# Patient Record
Sex: MALE | Race: WHITE | NOT HISPANIC OR LATINO | URBAN - METROPOLITAN AREA
[De-identification: names, ages, dates, MRNs, and addresses within clinical notes are randomized per-mention and may not be internally consistent; named-entity substitution may affect disease eponyms.]

---

## 2020-01-21 ENCOUNTER — OUTPATIENT (OUTPATIENT)
Dept: OUTPATIENT SERVICES | Facility: HOSPITAL | Age: 75
LOS: 1 days | End: 2020-01-21
Payer: COMMERCIAL

## 2020-01-21 DIAGNOSIS — Z22.321 CARRIER OR SUSPECTED CARRIER OF METHICILLIN SUSCEPTIBLE STAPHYLOCOCCUS AUREUS: ICD-10-CM

## 2020-01-21 LAB
MRSA PCR RESULT.: NEGATIVE — SIGNIFICANT CHANGE UP
S AUREUS DNA NOSE QL NAA+PROBE: NEGATIVE — SIGNIFICANT CHANGE UP

## 2020-01-21 PROCEDURE — 87641 MR-STAPH DNA AMP PROBE: CPT

## 2020-02-03 RX ORDER — APREPITANT 80 MG/1
40 CAPSULE ORAL ONCE
Refills: 0 | Status: DISCONTINUED | OUTPATIENT
Start: 2020-02-04 | End: 2020-02-07

## 2020-02-03 RX ORDER — ACETAMINOPHEN 500 MG
1000 TABLET ORAL ONCE
Refills: 0 | Status: DISCONTINUED | OUTPATIENT
Start: 2020-02-04 | End: 2020-02-07

## 2020-02-03 RX ORDER — GABAPENTIN 400 MG/1
100 CAPSULE ORAL ONCE
Refills: 0 | Status: DISCONTINUED | OUTPATIENT
Start: 2020-02-04 | End: 2020-02-07

## 2020-02-03 RX ORDER — CELECOXIB 200 MG/1
400 CAPSULE ORAL ONCE
Refills: 0 | Status: DISCONTINUED | OUTPATIENT
Start: 2020-02-04 | End: 2020-02-07

## 2020-02-03 NOTE — H&P ADULT - PROBLEM SELECTOR PROBLEM 8
FANNYMTCB regarding upcoming wire localization. Awaiting response back from pt.
Mitral valve regurgitation

## 2020-02-03 NOTE — H&P ADULT - HISTORY OF PRESENT ILLNESS
74F with right knee pain x  Presents today for right TKA 74F with right knee pain x 35y. Pt. states 35y ago he was doing karate when he slipped and injured his knee. Pt. states he tore his ligaments and had knee arthroscopy. Pt. has increased pain with walking and no pain when he is sitting. Pt. uses cane assistance for long distances.  Pt. has been to physical therapy and goes to the gym to stay active. Denies any numbness/tingling in b/l les. Pt. has afib on coumadin (inr 2.4 preop 1.4 dos) last dose 1/29/20. Presents today for right TKA.

## 2020-02-03 NOTE — H&P ADULT - NSICDXPASTMEDICALHX_GEN_ALL_CORE_FT
PAST MEDICAL HISTORY:  Afib     Alcoholism     CAD (coronary artery disease)     Cardiomyopathy     HTN (hypertension)     Hyperlipidemia     Mitral valve regurgitation

## 2020-02-03 NOTE — H&P ADULT - NSHPPHYSICALEXAM_GEN_ALL_CORE
PE: Decreased ROM to right knee secondary to pain  Rest of PE per medical clearance General: NAD   PE: Right le skin intact, no erythema, ecchymosis. SLT INTACT, DP/PT 2+, EHL/TA/GS 5/5. Decreased ROM to right knee secondary to pain  Rest of PE per medical clearance

## 2020-02-03 NOTE — H&P ADULT - PROBLEM SELECTOR PLAN 1
Admit to Orthopaedic Service.  Presents today for elective right TKA   Pt medically stable and cleared for procedure today by Dr. Willard and Dr. Daley

## 2020-02-03 NOTE — H&P ADULT - NSHPLABSRESULTS_GEN_ALL_CORE
Preop CBC, BMP, PT/PTT/INR, UA within normal limits- reviewed by medical clearance.   EKG: Afib  Stress test negative, reviewed, per Dr. Daley  CXR: Stable, cardiomegaly; Within normal limits, per medical clearance.

## 2020-02-03 NOTE — H&P ADULT - PROBLEM SELECTOR PLAN 2
Coumadin held for OR (Regimen is Coumadin 5mg on Wed and Friday, 2.5mg on the other days)   Preop stress test  Should be put back on AC as soon as possible per Dr. Daley

## 2020-02-04 ENCOUNTER — INPATIENT (INPATIENT)
Facility: HOSPITAL | Age: 75
LOS: 2 days | Discharge: HOME | DRG: 470 | End: 2020-02-07
Attending: ORTHOPAEDIC SURGERY | Admitting: ORTHOPAEDIC SURGERY
Payer: MEDICARE

## 2020-02-04 VITALS
TEMPERATURE: 96 F | WEIGHT: 203.05 LBS | RESPIRATION RATE: 16 BRPM | SYSTOLIC BLOOD PRESSURE: 98 MMHG | DIASTOLIC BLOOD PRESSURE: 66 MMHG | HEART RATE: 79 BPM | HEIGHT: 69 IN

## 2020-02-04 DIAGNOSIS — I34.0 NONRHEUMATIC MITRAL (VALVE) INSUFFICIENCY: ICD-10-CM

## 2020-02-04 DIAGNOSIS — E78.5 HYPERLIPIDEMIA, UNSPECIFIED: ICD-10-CM

## 2020-02-04 DIAGNOSIS — F10.20 ALCOHOL DEPENDENCE, UNCOMPLICATED: ICD-10-CM

## 2020-02-04 DIAGNOSIS — Z98.890 OTHER SPECIFIED POSTPROCEDURAL STATES: Chronic | ICD-10-CM

## 2020-02-04 DIAGNOSIS — I10 ESSENTIAL (PRIMARY) HYPERTENSION: ICD-10-CM

## 2020-02-04 DIAGNOSIS — I48.91 UNSPECIFIED ATRIAL FIBRILLATION: ICD-10-CM

## 2020-02-04 DIAGNOSIS — M19.90 UNSPECIFIED OSTEOARTHRITIS, UNSPECIFIED SITE: ICD-10-CM

## 2020-02-04 DIAGNOSIS — I25.10 ATHEROSCLEROTIC HEART DISEASE OF NATIVE CORONARY ARTERY WITHOUT ANGINA PECTORIS: ICD-10-CM

## 2020-02-04 DIAGNOSIS — I42.9 CARDIOMYOPATHY, UNSPECIFIED: ICD-10-CM

## 2020-02-04 LAB
APTT BLD: 32.4 SEC — SIGNIFICANT CHANGE UP (ref 27.5–36.3)
GLUCOSE BLDC GLUCOMTR-MCNC: 105 MG/DL — HIGH (ref 70–99)
INR BLD: 1.43 — HIGH (ref 0.88–1.16)
PROTHROM AB SERPL-ACNC: 16.5 SEC — HIGH (ref 10–12.9)

## 2020-02-04 PROCEDURE — 99223 1ST HOSP IP/OBS HIGH 75: CPT

## 2020-02-04 PROCEDURE — 73560 X-RAY EXAM OF KNEE 1 OR 2: CPT | Mod: 26,RT

## 2020-02-04 RX ORDER — MAGNESIUM HYDROXIDE 400 MG/1
30 TABLET, CHEWABLE ORAL DAILY
Refills: 0 | Status: DISCONTINUED | OUTPATIENT
Start: 2020-02-04 | End: 2020-02-07

## 2020-02-04 RX ORDER — SIMVASTATIN 20 MG/1
40 TABLET, FILM COATED ORAL AT BEDTIME
Refills: 0 | Status: DISCONTINUED | OUTPATIENT
Start: 2020-02-04 | End: 2020-02-07

## 2020-02-04 RX ORDER — ASCORBIC ACID 60 MG
500 TABLET,CHEWABLE ORAL DAILY
Refills: 0 | Status: DISCONTINUED | OUTPATIENT
Start: 2020-02-04 | End: 2020-02-07

## 2020-02-04 RX ORDER — BUPIVACAINE 13.3 MG/ML
20 INJECTION, SUSPENSION, LIPOSOMAL INFILTRATION ONCE
Refills: 0 | Status: DISCONTINUED | OUTPATIENT
Start: 2020-02-04 | End: 2020-02-07

## 2020-02-04 RX ORDER — FOLIC ACID 0.8 MG
1 TABLET ORAL DAILY
Refills: 0 | Status: DISCONTINUED | OUTPATIENT
Start: 2020-02-04 | End: 2020-02-07

## 2020-02-04 RX ORDER — SENNA PLUS 8.6 MG/1
2 TABLET ORAL AT BEDTIME
Refills: 0 | Status: DISCONTINUED | OUTPATIENT
Start: 2020-02-04 | End: 2020-02-07

## 2020-02-04 RX ORDER — CEFAZOLIN SODIUM 1 G
2000 VIAL (EA) INJECTION EVERY 8 HOURS
Refills: 0 | Status: COMPLETED | OUTPATIENT
Start: 2020-02-04 | End: 2020-02-05

## 2020-02-04 RX ORDER — ASCORBIC ACID 60 MG
1 TABLET,CHEWABLE ORAL
Qty: 0 | Refills: 0 | DISCHARGE

## 2020-02-04 RX ORDER — OXYCODONE HYDROCHLORIDE 5 MG/1
5 TABLET ORAL EVERY 4 HOURS
Refills: 0 | Status: DISCONTINUED | OUTPATIENT
Start: 2020-02-04 | End: 2020-02-07

## 2020-02-04 RX ORDER — CARVEDILOL PHOSPHATE 80 MG/1
25 CAPSULE, EXTENDED RELEASE ORAL EVERY 12 HOURS
Refills: 0 | Status: DISCONTINUED | OUTPATIENT
Start: 2020-02-04 | End: 2020-02-07

## 2020-02-04 RX ORDER — FAMOTIDINE 10 MG/ML
1 INJECTION INTRAVENOUS
Qty: 0 | Refills: 0 | DISCHARGE

## 2020-02-04 RX ORDER — FERROUS SULFATE 325(65) MG
325 TABLET ORAL
Refills: 0 | Status: DISCONTINUED | OUTPATIENT
Start: 2020-02-04 | End: 2020-02-07

## 2020-02-04 RX ORDER — ENOXAPARIN SODIUM 100 MG/ML
30 INJECTION SUBCUTANEOUS EVERY 12 HOURS
Refills: 0 | Status: DISCONTINUED | OUTPATIENT
Start: 2020-02-05 | End: 2020-02-07

## 2020-02-04 RX ORDER — FERROUS SULFATE 325(65) MG
0 TABLET ORAL
Qty: 0 | Refills: 0 | DISCHARGE

## 2020-02-04 RX ORDER — DILTIAZEM HCL 120 MG
120 CAPSULE, EXT RELEASE 24 HR ORAL DAILY
Refills: 0 | Status: DISCONTINUED | OUTPATIENT
Start: 2020-02-04 | End: 2020-02-06

## 2020-02-04 RX ORDER — HYDROMORPHONE HYDROCHLORIDE 2 MG/ML
0.5 INJECTION INTRAMUSCULAR; INTRAVENOUS; SUBCUTANEOUS EVERY 4 HOURS
Refills: 0 | Status: DISCONTINUED | OUTPATIENT
Start: 2020-02-04 | End: 2020-02-07

## 2020-02-04 RX ORDER — CELECOXIB 200 MG/1
200 CAPSULE ORAL
Refills: 0 | Status: DISCONTINUED | OUTPATIENT
Start: 2020-02-06 | End: 2020-02-07

## 2020-02-04 RX ORDER — DILTIAZEM HCL 120 MG
1 CAPSULE, EXT RELEASE 24 HR ORAL
Qty: 0 | Refills: 0 | DISCHARGE

## 2020-02-04 RX ORDER — CEFAZOLIN SODIUM 1 G
2000 VIAL (EA) INJECTION EVERY 8 HOURS
Refills: 0 | Status: DISCONTINUED | OUTPATIENT
Start: 2020-02-04 | End: 2020-02-04

## 2020-02-04 RX ORDER — CARVEDILOL PHOSPHATE 80 MG/1
1 CAPSULE, EXTENDED RELEASE ORAL
Qty: 0 | Refills: 0 | DISCHARGE

## 2020-02-04 RX ORDER — PANTOPRAZOLE SODIUM 20 MG/1
40 TABLET, DELAYED RELEASE ORAL
Refills: 0 | Status: DISCONTINUED | OUTPATIENT
Start: 2020-02-04 | End: 2020-02-07

## 2020-02-04 RX ORDER — HYDROMORPHONE HYDROCHLORIDE 2 MG/ML
0.5 INJECTION INTRAMUSCULAR; INTRAVENOUS; SUBCUTANEOUS
Refills: 0 | Status: DISCONTINUED | OUTPATIENT
Start: 2020-02-04 | End: 2020-02-07

## 2020-02-04 RX ORDER — ACETAMINOPHEN 500 MG
650 TABLET ORAL EVERY 6 HOURS
Refills: 0 | Status: DISCONTINUED | OUTPATIENT
Start: 2020-02-04 | End: 2020-02-05

## 2020-02-04 RX ORDER — LISINOPRIL 2.5 MG/1
20 TABLET ORAL
Refills: 0 | Status: DISCONTINUED | OUTPATIENT
Start: 2020-02-04 | End: 2020-02-04

## 2020-02-04 RX ORDER — SODIUM CHLORIDE 9 MG/ML
1000 INJECTION, SOLUTION INTRAVENOUS
Refills: 0 | Status: DISCONTINUED | OUTPATIENT
Start: 2020-02-04 | End: 2020-02-06

## 2020-02-04 RX ORDER — ONDANSETRON 8 MG/1
4 TABLET, FILM COATED ORAL EVERY 6 HOURS
Refills: 0 | Status: DISCONTINUED | OUTPATIENT
Start: 2020-02-04 | End: 2020-02-07

## 2020-02-04 RX ORDER — FOLIC ACID 0.8 MG
1 TABLET ORAL
Qty: 0 | Refills: 0 | DISCHARGE

## 2020-02-04 RX ORDER — EZETIMIBE AND SIMVASTATIN 10; 80 MG/1; MG/1
1 TABLET, FILM COATED ORAL
Qty: 0 | Refills: 0 | DISCHARGE

## 2020-02-04 RX ORDER — LISINOPRIL 2.5 MG/1
20 TABLET ORAL
Refills: 0 | Status: DISCONTINUED | OUTPATIENT
Start: 2020-02-04 | End: 2020-02-06

## 2020-02-04 RX ORDER — GABAPENTIN 400 MG/1
100 CAPSULE ORAL EVERY 8 HOURS
Refills: 0 | Status: DISCONTINUED | OUTPATIENT
Start: 2020-02-04 | End: 2020-02-07

## 2020-02-04 RX ORDER — TRAMADOL HYDROCHLORIDE 50 MG/1
50 TABLET ORAL EVERY 6 HOURS
Refills: 0 | Status: DISCONTINUED | OUTPATIENT
Start: 2020-02-04 | End: 2020-02-07

## 2020-02-04 RX ORDER — POLYETHYLENE GLYCOL 3350 17 G/17G
17 POWDER, FOR SOLUTION ORAL DAILY
Refills: 0 | Status: DISCONTINUED | OUTPATIENT
Start: 2020-02-04 | End: 2020-02-07

## 2020-02-04 RX ADMIN — Medication 650 MILLIGRAM(S): at 22:27

## 2020-02-04 RX ADMIN — Medication 325 MILLIGRAM(S): at 18:11

## 2020-02-04 RX ADMIN — Medication 1 TABLET(S): at 13:46

## 2020-02-04 RX ADMIN — Medication 650 MILLIGRAM(S): at 22:57

## 2020-02-04 RX ADMIN — OXYCODONE HYDROCHLORIDE 5 MILLIGRAM(S): 5 TABLET ORAL at 15:31

## 2020-02-04 RX ADMIN — OXYCODONE HYDROCHLORIDE 5 MILLIGRAM(S): 5 TABLET ORAL at 22:27

## 2020-02-04 RX ADMIN — GABAPENTIN 100 MILLIGRAM(S): 400 CAPSULE ORAL at 22:27

## 2020-02-04 RX ADMIN — GABAPENTIN 100 MILLIGRAM(S): 400 CAPSULE ORAL at 13:46

## 2020-02-04 RX ADMIN — Medication 2000 MILLIGRAM(S): at 18:12

## 2020-02-04 RX ADMIN — POLYETHYLENE GLYCOL 3350 17 GRAM(S): 17 POWDER, FOR SOLUTION ORAL at 18:12

## 2020-02-04 RX ADMIN — Medication 650 MILLIGRAM(S): at 15:31

## 2020-02-04 RX ADMIN — CARVEDILOL PHOSPHATE 25 MILLIGRAM(S): 80 CAPSULE, EXTENDED RELEASE ORAL at 18:55

## 2020-02-04 RX ADMIN — Medication 1 MILLIGRAM(S): at 13:46

## 2020-02-04 RX ADMIN — OXYCODONE HYDROCHLORIDE 5 MILLIGRAM(S): 5 TABLET ORAL at 14:31

## 2020-02-04 RX ADMIN — Medication 650 MILLIGRAM(S): at 14:31

## 2020-02-04 RX ADMIN — OXYCODONE HYDROCHLORIDE 5 MILLIGRAM(S): 5 TABLET ORAL at 22:57

## 2020-02-04 RX ADMIN — Medication 500 MILLIGRAM(S): at 18:12

## 2020-02-04 NOTE — DISCHARGE NOTE PROVIDER - HOSPITAL COURSE
Admitted    Surgery - right total knee replacement    Jessy-op Antibiotics    Pain control    DVT prophylaxis    OOB/Physical Therapy

## 2020-02-04 NOTE — ASU PATIENT PROFILE, ADULT - PMH
Afib    Alcoholism    CAD (coronary artery disease)    Cardiomyopathy    HTN (hypertension)    Hyperlipidemia    Mitral valve regurgitation

## 2020-02-04 NOTE — PHYSICAL THERAPY INITIAL EVALUATION ADULT - ADDITIONAL COMMENTS
Pt lives with his wife in a house with no steps to enter. At baseline, ambulates independently with no DME, however states that he has poor endurance and can not stand for a long time ([place]

## 2020-02-04 NOTE — DISCHARGE NOTE PROVIDER - CARE PROVIDER_API CALL
Heath Torres)  Orthopaedic Surgery  [address] [place] [address]  Phone: [phone]  Fax: [phone]  Follow Up Time:

## 2020-02-04 NOTE — DISCHARGE NOTE PROVIDER - NSDCFUADDAPPT_GEN_ALL_CORE_FT
Please follow-up with your provider, Gris Paris NP on Tuesday, as you have already planned. She will check your INR, and advise you when to stop lovenox.

## 2020-02-04 NOTE — ASU PREOP CHECKLIST - 1.
Meds not given due the anesthesia delayed the case for 3hours pt drunk protein drink .  GOT THE MEDCATYION READY PT WAS BROUGHT IN  BY OR NURSE. INFORMED THE TEAM PT ALREADY INTUBATED

## 2020-02-04 NOTE — PHYSICAL THERAPY INITIAL EVALUATION ADULT - GAIT DISTANCE, PT EVAL
10 feet/Distance limited due to pt reporting increasing lightheadedness (VSS, refer to vitals flowsheet)

## 2020-02-04 NOTE — DISCHARGE NOTE PROVIDER - NSDCMRMEDTOKEN_GEN_ALL_CORE_FT
carvedilol 25 mg oral tablet: 1 tab(s) orally 2 times a day  dilTIAZem 120 mg/24 hours oral capsule, extended release: 1 cap(s) orally 2 times a day  famotidine 10 mg oral tablet: 1 tab(s) orally once, As Needed  ferrous sulfate 75 mg (15 mg elemental iron) oral tablet:   folic acid 0.4 mg oral tablet: 1 tab(s) orally once a day  omeprazole 20 mg oral delayed release capsule: 1 cap(s) orally once a day  ramipril 5 mg oral capsule: 1 cap(s) orally 2 times a day  ramipril 5 mg oral capsule: 1 cap(s) orally once a day  Vitamin C 500 mg oral tablet: 1 tab(s) orally once a day  Vytorin 10 mg-40 mg oral tablet: 1 tab(s) orally once a day  warfarin 5 mg oral tablet: 1 tab(s) orally once a day  zolpidem 5 mg oral tablet: 1 tab(s) orally once a day (at bedtime) carvedilol 25 mg oral tablet: 1 tab(s) orally 2 times a day  celecoxib 200 mg oral capsule: 1 cap(s) orally 2 times a day for 1 week  dilTIAZem 120 mg/24 hours oral capsule, extended release: 1 cap(s) orally 2 times a day  enoxaparin 30 mg/0.3 mL injectable solution: 30 milligram(s) injectable every 12 hours     dispense: 12 syringes  famotidine 10 mg oral tablet: 1 tab(s) orally once, As Needed  ferrous sulfate 75 mg (15 mg elemental iron) oral tablet:   folic acid 0.4 mg oral tablet: 1 tab(s) orally once a day  gabapentin 100 mg oral capsule: 1 cap(s) orally 3 times a day for 4 weeks  omeprazole 20 mg oral delayed release capsule: 1 cap(s) orally once a day.  YOU MAY TAKE THIS OR THE OMEPRAZOLE YOU NORMALLY TAKE. YOU DO NOT NEED TO TAKE BOTH.  oxyCODONE 5 mg oral tablet: 1 tab(s) orally every 4 hours, As needed, Severe Pain (7 - 10) MDD:6  pantoprazole 40 mg oral delayed release tablet: 1 tab(s) orally once a day (before a meal)  polyethylene glycol 3350 oral powder for reconstitution: 17 gram(s) orally once a day  ramipril 5 mg oral capsule: 1 cap(s) orally once a day  senna oral tablet: 2 tab(s) orally once a day (at bedtime), As needed, Constipation  traMADol 50 mg oral tablet: 1 tab(s) orally every 6 hours, As needed, Moderate Pain (4 - 6) MDD:4  Vitamin C 500 mg oral tablet: 1 tab(s) orally once a day  Vytorin 10 mg-40 mg oral tablet: 1 tab(s) orally once a day  warfarin 5 mg oral tablet: Take one 5mg tab(s) orally once a day through Sunday 2/9. On Monday 2/10, resume your M/W/F 2.5mg dosage, and Tue/Th/Sa/Su 5mg dose carvedilol 25 mg oral tablet: 1 tab(s) orally 2 times a day  celecoxib 200 mg oral capsule: 1 cap(s) orally 2 times a day for 1 week  dilTIAZem 120 mg/24 hours oral capsule, extended release: 1 cap(s) orally 2 times a day  enoxaparin 30 mg/0.3 mL injectable solution: 30 milligram(s) injectable every 12 hours     dispense: 12 syringes  famotidine 10 mg oral tablet: 1 tab(s) orally once, As Needed  ferrous sulfate 75 mg (15 mg elemental iron) oral tablet:   folic acid 0.4 mg oral tablet: 1 tab(s) orally once a day  gabapentin 100 mg oral capsule: 1 cap(s) orally 3 times a day for 4 weeks  omeprazole 20 mg oral delayed release capsule: 1 cap(s) orally once a day.  YOU MAY TAKE THIS OR THE OMEPRAZOLE YOU NORMALLY TAKE. YOU DO NOT NEED TO TAKE BOTH.  oxyCODONE 5 mg oral tablet: 1 tab(s) orally every 4 hours, As needed, Severe Pain (7 - 10) MDD:6  pantoprazole 40 mg oral delayed release tablet: 1 tab(s) orally once a day (before a meal)  polyethylene glycol 3350 oral powder for reconstitution: 17 gram(s) orally once a day  senna oral tablet: 2 tab(s) orally once a day (at bedtime), As needed, Constipation  traMADol 50 mg oral tablet: 1 tab(s) orally every 6 hours, As needed, Moderate Pain (4 - 6) MDD:4  Vitamin C 500 mg oral tablet: 1 tab(s) orally once a day  Vytorin 10 mg-40 mg oral tablet: 1 tab(s) orally once a day  warfarin 5 mg oral tablet: Take one 5mg tab(s) orally once a day through Sunday 2/9. On Monday 2/10, resume your M/W/F 2.5mg dosage, and Tue/Th/Sa/Su 5mg dose

## 2020-02-04 NOTE — DISCHARGE NOTE PROVIDER - NSDCFUADDINST_GEN_ALL_CORE_FT
Take tylenol 650mg every 6 hours for 2 weeks  Take Diclofenac 75mg twice a day for 2 weeks (Can take aleve 500mg twice a day instead)  Take Aspirin 325mg twice a day for 2 weeks, then daily for 2 weeks  Take Protonix 40mg daily for 4 weeks  Take Gabapentin 100mg three times a day for 4 weeks  Take Oxycodone 1-2 tabs every 4-6 hours as needed  Take Tramadol 1 tab every 6 hours as needed.  Take Colace 100mg twice a day as needed.  Wear compression stockings for 4 weeks  Weight bear as tolerated with assistive device  No strenuous activity, heavy lifting, driving or returning to work until cleared by MD.  You may shower - dressing is water-resistant, no soaking in bathtubs.  Keep dressing on until appointment with Dr. Torres, call for 2 week follow up.  Swelling may travel all the way down leg to foot, this is normal and will subside in a few weeks.  Contact your doctor if you experience: fever greater than 101.5, chills, chest pain, difficulty breathing, redness or excessive drainage around the incision, other concerns.  Follow up with your primary care provider. You should continue coumadin 5mg daily through Sunday, as recommended by your Nurse Practitioner, Gris Paris. On Monday 2/10 resume your normal coumadin routine, and take 2.5mg on Monday/Wednesday Friday, and 5mg daily on Tuesday/Thursday/Saturday/Sunday unless instructed otherwise after following up with MALENA Paris. Continue lovenox 30mg injections every 12 hours until your follow-up appointment with MALENA Paris. She will further instruct you on whether you need to continue to take the lovenox, and what dosages of coumadin to take.      You were prescribed the following medications:  Take Celebrex 200 twice a day for 1 weeks (Can take aleve 500mg twice a day instead)  Take Aspirin 81mg twice a day for 30 days.  Take Protonix 40mg daily for 4 weeks. You may take this or your omeprazole. Do not take both.  Take Gabapentin 100mg three times a day for 4 weeks  Take Oxycodone 1-2 tabs every 4-6 hours as needed  Take Tramadol 1 tab every 6 hours as needed.  _____________________________  Wear compression stockings for 4 weeks  Weight bear as tolerated with assistive device  No strenuous activity, heavy lifting, driving or returning to work until cleared by MD.  You may shower - dressing is water-resistant, no soaking in bathtubs.  Keep dressing on until appointment with Dr. Torres, call for 2 week follow up.  Swelling may travel all the way down leg to foot, this is normal and will subside in a few weeks.  Contact your doctor if you experience: fever greater than 101.5, chills, chest pain, difficulty breathing, redness or excessive drainage around the incision, other concerns.  Follow up with your primary care provider. You lisinopril was stopped in the hospital, because you were dizzy out of bed, and orthostatic at times. This can be restarted outpatient, when you are feeling better. Please follow up wit your primary care provider and cardiologist.    You should continue coumadin 5mg daily through Sunday, as recommended by your Nurse Practitioner, Gris Paris. On Monday 2/10 resume your normal coumadin routine, and take 2.5mg on Monday/Wednesday Friday, and 5mg daily on Tuesday/Thursday/Saturday/Sunday unless instructed otherwise after following up with MALENA Paris. Continue lovenox 30mg injections every 12 hours until your follow-up appointment with MALENA Paris. She will further instruct you on whether you need to continue to take the lovenox, and what dosages of coumadin to take.      You were prescribed the following medications:  Take Celebrex 200 twice a day for 1 weeks (Can take aleve 500mg twice a day instead)  Take Aspirin 81mg twice a day for 30 days.  Take Protonix 40mg daily for 4 weeks. You may take this or your omeprazole. Do not take both.  Take Gabapentin 100mg three times a day for 4 weeks  Take Oxycodone 1-2 tabs every 4-6 hours as needed  Take Tramadol 1 tab every 6 hours as needed.  _____________________________  Wear compression stockings for 4 weeks  Weight bear as tolerated with assistive device  No strenuous activity, heavy lifting, driving or returning to work until cleared by MD.  You may shower - dressing is water-resistant, no soaking in bathtubs.  Keep dressing on until appointment with Dr. Torres, call for 2 week follow up.  Swelling may travel all the way down leg to foot, this is normal and will subside in a few weeks.  Contact your doctor if you experience: fever greater than 101.5, chills, chest pain, difficulty breathing, redness or excessive drainage around the incision, other concerns.  Follow up with your primary care provider. You lisinopril was stopped in the hospital, because you were dizzy out of bed, and orthostatic at times. This can be restarted outpatient, when you are feeling better. Please follow up wit your primary care provider and cardiologist.    You should continue coumadin 5mg daily through Sunday, as recommended by your Nurse Practitioner, Gris Paris. On Monday 2/10 resume your normal coumadin routine, and take 2.5mg on Monday/Wednesday Friday, and 5mg daily on Tuesday/Thursday/Saturday/Sunday unless instructed otherwise after following up with MALENA Paris. Continue lovenox 30mg injections every 12 hours until your follow-up appointment with MALENA Paris. She will further instruct you on whether you need to continue to take the lovenox, and what dosages of coumadin to take.      You were prescribed the following medications:  Take Celebrex 200 twice a day for 1 weeks (Can take aleve 500mg twice a day instead).   Take Aspirin 81mg twice a day for 30 days.  Take Protonix 40mg daily for 4 weeks. You may take this or your omeprazole. Do not take both.  Take Gabapentin 100mg three times a day for 4 weeks.  Take Oxycodone 1-2 tabs every 4-6 hours as needed  Take Tramadol 1 tab every 6 hours as needed.  _____________________________  Wear compression stockings for 4 weeks  Weight bear as tolerated with assistive device  No strenuous activity, heavy lifting, driving or returning to work until cleared by MD.  You may shower - dressing is water-resistant, no soaking in bathtubs.  Keep dressing on until appointment with Dr. Torres, call for 2 week follow up.  Swelling may travel all the way down leg to foot, this is normal and will subside in a few weeks.  Contact your doctor if you experience: fever greater than 101.5, chills, chest pain, difficulty breathing, redness or excessive drainage around the incision, other concerns.  Follow up with your primary care provider.

## 2020-02-04 NOTE — CONSULT NOTE ADULT - ASSESSMENT
74 year old s/p R TKR, medicine following for co-management.     1) Dizziness: check orthostatics, hold anti-HTN, likely 2/2 dehydration/anesthesia/opioids  2) Post-op state: c/w pain control, c/w bowel regimen, c/w IS  3) EtOH use: quit one month ago per patient and wife, no indication for treatment of EtOH w/d  4) A fib: unclear why he is on Coumadin, obtain collateral, may be 2/2 valvular a fib, c/w lovenox w/ bridge to Coumadin when given ortho clearance, c/w metoprolol  5) Alcoholic cardiomyopathy: recent EF 50% per report, hold ACEi in setting of dizziness, c/w coreg, c/w cardizem - can hold if orthostatic positive  6) HLD: c/w statin  7) DVT ppx: lovenox BID until bridged to Coumadin

## 2020-02-04 NOTE — PHYSICAL THERAPY INITIAL EVALUATION ADULT - PERTINENT HX OF CURRENT PROBLEM, REHAB EVAL
74F with right knee pain x 35y. Pt. states 35y ago he was doing karate when he slipped and injured his knee. Pt. states he tore his ligaments and had knee arthroscopy. Pt. has increased pain with walking and no pain when he is sitting.

## 2020-02-04 NOTE — PHYSICAL THERAPY INITIAL EVALUATION ADULT - GAIT DEVIATIONS NOTED, PT EVAL
antalgic gait/decreased stride length/decreased weight-shifting ability/decreased step length/decreased jaqueline

## 2020-02-04 NOTE — PROGRESS NOTE ADULT - SUBJECTIVE AND OBJECTIVE BOX
Orthopedics Post Op Check    Procedure: right TKA   Surgeon: Dr. Torres    Pt comfortable, without complaints. Pain well controlled in PACU. Visiting with family  Denies CP, SOB, N/V, numbness/tingling     Vital Signs Last 24 Hrs  T(C): 36.3 (04 Feb 2020 12:59), Max: 36.3 (04 Feb 2020 12:59)  T(F): 97.4 (04 Feb 2020 12:59), Max: 97.4 (04 Feb 2020 12:59)  HR: 70 (04 Feb 2020 12:59) (70 - 84)  BP: 157/82 (04 Feb 2020 12:59) (98/66 - 162/84)  BP(mean): 114 (04 Feb 2020 12:29) (88 - 116)  RR: 20 (04 Feb 2020 12:59) (6 - 21)  SpO2: 100% (04 Feb 2020 12:59) (90% - 100%)  AVSS, NAD      General: Pt Alert and oriented, comfortable  Dressing C/D/I- aquacel   Sensation intact and equal BLE   Motor ehl/ta/gs/fhl 5/5 BLE   Pulses dp palpable BLE       Post op XR: s/p right TKA hardware in place     A/P: 74yMale POD#0 s/p right TKA   - Stable; regional per anesthesia   - Pain Control  - DVT ppx:  - Jessy-op abx:  - PT, WBS:   - F/U AM Labs

## 2020-02-04 NOTE — CONSULT NOTE ADULT - SUBJECTIVE AND OBJECTIVE BOX
Patient is a 74y old  Male who presents with a chief complaint of right knee (04 Feb 2020 13:21)      HPI:  74F with right knee pain x 35y. Pt. states 35y ago he was doing karate when he slipped and injured his knee. Pt. states he tore his ligaments and had knee arthroscopy. Pt. has increased pain with walking and no pain when he is sitting. Pt. uses cane assistance for long distances.  Pt. has been to physical therapy and goes to the gym to stay active. Denies any numbness/tingling in b/l les. Pt. has afib on coumadin (inr 2.4 preop 1.4 dos) last dose 1/29/20. Presents today for right TKA. (03 Feb 2020 14:36)    Seen POD 0, wife at bedside. Feels well overall, pain is somewhat controlled. Had dizziness upon standing.     Hx of EtOH use however quit one month ago after being told he had alcoholic cardiomyopathy.     REVIEW OF SYSTEMS dry mouth, otherwise negative      General:	    Skin/Breast:  	  Ophthalmologic:  	  ENMT:	    Respiratory and Thorax:  	  Cardiovascular:	    Gastrointestinal:	    Genitourinary:	    Musculoskeletal:	    Neurological:	    Psychiatric:	    Hematology/Lymphatics:	    Endocrine:	    Allergic/Immunologic:	    Allergies    No Known Allergies    Intolerances        Home Medications:  carvedilol 25 mg oral tablet: 1 tab(s) orally 2 times a day (04 Feb 2020 06:55)  dilTIAZem 120 mg/24 hours oral capsule, extended release: 1 cap(s) orally 2 times a day (04 Feb 2020 06:55)  famotidine 10 mg oral tablet: 1 tab(s) orally once, As Needed (04 Feb 2020 06:55)  ferrous sulfate 75 mg (15 mg elemental iron) oral tablet:  (04 Feb 2020 06:55)  folic acid 0.4 mg oral tablet: 1 tab(s) orally once a day (04 Feb 2020 06:55)  omeprazole 20 mg oral delayed release capsule: 1 cap(s) orally once a day (04 Feb 2020 06:55)  ramipril 5 mg oral capsule: 1 cap(s) orally 2 times a day (04 Feb 2020 06:55)  ramipril 5 mg oral capsule: 1 cap(s) orally once a day (04 Feb 2020 06:55)  Vitamin C 500 mg oral tablet: 1 tab(s) orally once a day (04 Feb 2020 06:55)  Vytorin 10 mg-40 mg oral tablet: 1 tab(s) orally once a day (04 Feb 2020 06:55)  warfarin 5 mg oral tablet: 1 tab(s) orally once a day (04 Feb 2020 06:55)  zolpidem 5 mg oral tablet: 1 tab(s) orally once a day (at bedtime) (04 Feb 2020 06:55)      PAST MEDICAL & SURGICAL HISTORY:  Mitral valve regurgitation  Alcoholism  Hyperlipidemia  HTN (hypertension)  Cardiomyopathy  CAD (coronary artery disease)  Afib  H/O right knee surgery      FAMILY HISTORY: noncontributory      SOCIAL HISTORY: quit EtOH one month ago, former dentist      Vital Signs Last 24 Hrs  T(C): 36.3 (04 Feb 2020 15:07), Max: 36.4 (04 Feb 2020 13:35)  T(F): 97.3 (04 Feb 2020 15:07), Max: 97.6 (04 Feb 2020 13:35)  HR: 84 (04 Feb 2020 15:30) (61 - 84)  BP: 131/90 (04 Feb 2020 15:30) (98/66 - 162/84)  BP(mean): 114 (04 Feb 2020 12:29) (88 - 116)  RR: 18 (04 Feb 2020 15:07) (6 - 21)  SpO2: 97% (04 Feb 2020 15:07) (90% - 100%)   I&O's Detail    04 Feb 2020 07:01  -  04 Feb 2020 16:47  --------------------------------------------------------  IN:    Oral Fluid: 500 mL  Total IN: 500 mL    OUT:  Total OUT: 0 mL    Total NET: 500 mL        Daily Height in cm: 175.26 (04 Feb 2020 06:30)    Daily     Physical Exam:   GEN: NAD, AAOx3, no signs of EtOH w/d, CIWA 0  HEENT: dry MM  CV: S1 S2 RRR,   Lung: CTAB  Abd: soft NT ND  Ext: R knee brace    MEDICATIONS  (STANDING):  acetaminophen   Tablet .. 1000 milliGRAM(s) Oral once  aprepitant 40 milliGRAM(s) Oral once  ascorbic acid 500 milliGRAM(s) Oral daily  BUpivacaine liposome 1.3% Injectable (no eMAR) 20 milliLiter(s) Local Injection once  carvedilol 25 milliGRAM(s) Oral every 12 hours  ceFAZolin  Injectable. 2000 milliGRAM(s) IV Push every 8 hours  celecoxib 400 milliGRAM(s) Oral once  diltiazem    milliGRAM(s) Oral daily  ferrous    sulfate 325 milliGRAM(s) Oral three times a day with meals  folic acid 1 milliGRAM(s) Oral daily  gabapentin 100 milliGRAM(s) Oral every 8 hours  gabapentin 100 milliGRAM(s) Oral once  lactated ringers. 1000 milliLiter(s) (100 mL/Hr) IV Continuous <Continuous>  lisinopril 20 milliGRAM(s) Oral two times a day  multivitamin 1 Tablet(s) Oral daily  pantoprazole    Tablet 40 milliGRAM(s) Oral before breakfast  polyethylene glycol 3350 17 Gram(s) Oral daily  simvastatin 40 milliGRAM(s) Oral at bedtime    MEDICATIONS  (PRN):  acetaminophen   Tablet .. 650 milliGRAM(s) Oral every 6 hours PRN Temp greater or equal to 38C (100.4F), Mild Pain (1 - 3)  aluminum hydroxide/magnesium hydroxide/simethicone Suspension 30 milliLiter(s) Oral four times a day PRN Indigestion  HYDROmorphone  Injectable 0.5 milliGRAM(s) IV Push every 4 hours PRN Breakthrough pain only  HYDROmorphone  Injectable 0.5 milliGRAM(s) IV Push every 15 minutes PRN Breakthrough Pain  magnesium hydroxide Suspension 30 milliLiter(s) Oral daily PRN Constipation  ondansetron Injectable 4 milliGRAM(s) IV Push every 6 hours PRN Nausea and/or Vomiting  oxyCODONE    IR 5 milliGRAM(s) Oral every 4 hours PRN Severe Pain (7 - 10)  senna 2 Tablet(s) Oral at bedtime PRN Constipation  traMADol 50 milliGRAM(s) Oral every 6 hours PRN Moderate Pain (4 - 6)                LABS:              PT/INR - ( 04 Feb 2020 07:44 )   PT: 16.5 sec;   INR: 1.43          PTT - ( 04 Feb 2020 07:44 )  PTT:32.4 sec  CAPILLARY BLOOD GLUCOSE      POCT Blood Glucose.: 105 mg/dL (04 Feb 2020 06:52)      RADIOLOGY & ADDITIONAL STUDIES:

## 2020-02-04 NOTE — PHYSICAL THERAPY INITIAL EVALUATION ADULT - ACTIVE RANGE OF MOTION EXAMINATION, REHAB EVAL
RLE AROM WFL with exception of knee flexion 3-80 degrees/Left LE Active ROM was WFL (within functional limits)/bilateral upper extremity Active ROM was WFL (within functional limits)

## 2020-02-05 LAB
ANION GAP SERPL CALC-SCNC: 13 MMOL/L — SIGNIFICANT CHANGE UP (ref 5–17)
BUN SERPL-MCNC: 12 MG/DL — SIGNIFICANT CHANGE UP (ref 7–23)
CALCIUM SERPL-MCNC: 9.5 MG/DL — SIGNIFICANT CHANGE UP (ref 8.4–10.5)
CHLORIDE SERPL-SCNC: 102 MMOL/L — SIGNIFICANT CHANGE UP (ref 96–108)
CO2 SERPL-SCNC: 22 MMOL/L — SIGNIFICANT CHANGE UP (ref 22–31)
CREAT SERPL-MCNC: 0.67 MG/DL — SIGNIFICANT CHANGE UP (ref 0.5–1.3)
GLUCOSE SERPL-MCNC: 155 MG/DL — HIGH (ref 70–99)
HCT VFR BLD CALC: 35.4 % — LOW (ref 39–50)
HCV AB S/CO SERPL IA: 0.14 S/CO — SIGNIFICANT CHANGE UP
HCV AB SERPL-IMP: SIGNIFICANT CHANGE UP
HGB BLD-MCNC: 11.6 G/DL — LOW (ref 13–17)
MCHC RBC-ENTMCNC: 28.9 PG — SIGNIFICANT CHANGE UP (ref 27–34)
MCHC RBC-ENTMCNC: 32.8 GM/DL — SIGNIFICANT CHANGE UP (ref 32–36)
MCV RBC AUTO: 88.1 FL — SIGNIFICANT CHANGE UP (ref 80–100)
NRBC # BLD: 0 /100 WBCS — SIGNIFICANT CHANGE UP (ref 0–0)
PLATELET # BLD AUTO: 117 K/UL — LOW (ref 150–400)
POTASSIUM SERPL-MCNC: 4.2 MMOL/L — SIGNIFICANT CHANGE UP (ref 3.5–5.3)
POTASSIUM SERPL-SCNC: 4.2 MMOL/L — SIGNIFICANT CHANGE UP (ref 3.5–5.3)
RBC # BLD: 4.02 M/UL — LOW (ref 4.2–5.8)
RBC # FLD: 13.3 % — SIGNIFICANT CHANGE UP (ref 10.3–14.5)
SODIUM SERPL-SCNC: 137 MMOL/L — SIGNIFICANT CHANGE UP (ref 135–145)
WBC # BLD: 11.4 K/UL — HIGH (ref 3.8–10.5)
WBC # FLD AUTO: 11.4 K/UL — HIGH (ref 3.8–10.5)

## 2020-02-05 PROCEDURE — 99233 SBSQ HOSP IP/OBS HIGH 50: CPT

## 2020-02-05 RX ORDER — GABAPENTIN 400 MG/1
1 CAPSULE ORAL
Qty: 84 | Refills: 0
Start: 2020-02-05 | End: 2020-03-03

## 2020-02-05 RX ORDER — WARFARIN SODIUM 2.5 MG/1
5 TABLET ORAL AT BEDTIME
Refills: 0 | Status: DISCONTINUED | OUTPATIENT
Start: 2020-02-05 | End: 2020-02-05

## 2020-02-05 RX ORDER — RAMIPRIL 5 MG
1 CAPSULE ORAL
Qty: 0 | Refills: 0 | DISCHARGE

## 2020-02-05 RX ORDER — PANTOPRAZOLE SODIUM 20 MG/1
1 TABLET, DELAYED RELEASE ORAL
Qty: 28 | Refills: 0
Start: 2020-02-05 | End: 2020-03-03

## 2020-02-05 RX ORDER — SENNA PLUS 8.6 MG/1
2 TABLET ORAL
Qty: 0 | Refills: 0 | DISCHARGE
Start: 2020-02-05

## 2020-02-05 RX ORDER — ACETAMINOPHEN 500 MG
650 TABLET ORAL EVERY 6 HOURS
Refills: 0 | Status: DISCONTINUED | OUTPATIENT
Start: 2020-02-05 | End: 2020-02-05

## 2020-02-05 RX ORDER — SODIUM CHLORIDE 9 MG/ML
500 INJECTION INTRAMUSCULAR; INTRAVENOUS; SUBCUTANEOUS ONCE
Refills: 0 | Status: COMPLETED | OUTPATIENT
Start: 2020-02-05 | End: 2020-02-05

## 2020-02-05 RX ORDER — WARFARIN SODIUM 2.5 MG/1
1 TABLET ORAL
Qty: 0 | Refills: 0 | DISCHARGE

## 2020-02-05 RX ORDER — WARFARIN SODIUM 2.5 MG/1
5 TABLET ORAL AT BEDTIME
Refills: 0 | Status: COMPLETED | OUTPATIENT
Start: 2020-02-05 | End: 2020-02-05

## 2020-02-05 RX ORDER — TRAMADOL HYDROCHLORIDE 50 MG/1
1 TABLET ORAL
Qty: 28 | Refills: 0
Start: 2020-02-05 | End: 2020-02-11

## 2020-02-05 RX ORDER — ZOLPIDEM TARTRATE 10 MG/1
1 TABLET ORAL
Qty: 0 | Refills: 0 | DISCHARGE

## 2020-02-05 RX ORDER — SODIUM CHLORIDE 9 MG/ML
1000 INJECTION, SOLUTION INTRAVENOUS
Refills: 0 | Status: DISCONTINUED | OUTPATIENT
Start: 2020-02-05 | End: 2020-02-07

## 2020-02-05 RX ORDER — ENOXAPARIN SODIUM 100 MG/ML
30 INJECTION SUBCUTANEOUS
Qty: 12 | Refills: 0
Start: 2020-02-05 | End: 2020-02-10

## 2020-02-05 RX ORDER — ACETAMINOPHEN 500 MG
650 TABLET ORAL EVERY 6 HOURS
Refills: 0 | Status: DISCONTINUED | OUTPATIENT
Start: 2020-02-05 | End: 2020-02-07

## 2020-02-05 RX ORDER — OXYCODONE HYDROCHLORIDE 5 MG/1
1 TABLET ORAL
Qty: 30 | Refills: 0
Start: 2020-02-05 | End: 2020-02-09

## 2020-02-05 RX ORDER — POLYETHYLENE GLYCOL 3350 17 G/17G
17 POWDER, FOR SOLUTION ORAL
Qty: 0 | Refills: 0 | DISCHARGE
Start: 2020-02-05

## 2020-02-05 RX ORDER — CELECOXIB 200 MG/1
1 CAPSULE ORAL
Qty: 14 | Refills: 0
Start: 2020-02-05 | End: 2020-02-11

## 2020-02-05 RX ORDER — ZALEPLON 10 MG
5 CAPSULE ORAL AT BEDTIME
Refills: 0 | Status: DISCONTINUED | OUTPATIENT
Start: 2020-02-05 | End: 2020-02-07

## 2020-02-05 RX ORDER — OMEPRAZOLE 10 MG/1
1 CAPSULE, DELAYED RELEASE ORAL
Qty: 0 | Refills: 0 | DISCHARGE

## 2020-02-05 RX ORDER — SODIUM CHLORIDE 9 MG/ML
500 INJECTION INTRAMUSCULAR; INTRAVENOUS; SUBCUTANEOUS
Refills: 0 | Status: COMPLETED | OUTPATIENT
Start: 2020-02-05 | End: 2020-02-05

## 2020-02-05 RX ADMIN — Medication 650 MILLIGRAM(S): at 22:34

## 2020-02-05 RX ADMIN — ENOXAPARIN SODIUM 30 MILLIGRAM(S): 100 INJECTION SUBCUTANEOUS at 06:06

## 2020-02-05 RX ADMIN — WARFARIN SODIUM 5 MILLIGRAM(S): 2.5 TABLET ORAL at 21:41

## 2020-02-05 RX ADMIN — Medication 500 MILLIGRAM(S): at 11:35

## 2020-02-05 RX ADMIN — Medication 2000 MILLIGRAM(S): at 02:41

## 2020-02-05 RX ADMIN — OXYCODONE HYDROCHLORIDE 5 MILLIGRAM(S): 5 TABLET ORAL at 15:20

## 2020-02-05 RX ADMIN — SODIUM CHLORIDE 100 MILLILITER(S): 9 INJECTION INTRAMUSCULAR; INTRAVENOUS; SUBCUTANEOUS at 12:35

## 2020-02-05 RX ADMIN — Medication 325 MILLIGRAM(S): at 17:43

## 2020-02-05 RX ADMIN — Medication 5 MILLIGRAM(S): at 01:50

## 2020-02-05 RX ADMIN — Medication 1 TABLET(S): at 11:35

## 2020-02-05 RX ADMIN — Medication 325 MILLIGRAM(S): at 07:36

## 2020-02-05 RX ADMIN — GABAPENTIN 100 MILLIGRAM(S): 400 CAPSULE ORAL at 14:19

## 2020-02-05 RX ADMIN — GABAPENTIN 100 MILLIGRAM(S): 400 CAPSULE ORAL at 06:04

## 2020-02-05 RX ADMIN — CARVEDILOL PHOSPHATE 25 MILLIGRAM(S): 80 CAPSULE, EXTENDED RELEASE ORAL at 17:43

## 2020-02-05 RX ADMIN — Medication 325 MILLIGRAM(S): at 11:35

## 2020-02-05 RX ADMIN — Medication 650 MILLIGRAM(S): at 08:45

## 2020-02-05 RX ADMIN — OXYCODONE HYDROCHLORIDE 5 MILLIGRAM(S): 5 TABLET ORAL at 22:34

## 2020-02-05 RX ADMIN — Medication 650 MILLIGRAM(S): at 23:34

## 2020-02-05 RX ADMIN — Medication 650 MILLIGRAM(S): at 09:45

## 2020-02-05 RX ADMIN — OXYCODONE HYDROCHLORIDE 5 MILLIGRAM(S): 5 TABLET ORAL at 23:34

## 2020-02-05 RX ADMIN — SODIUM CHLORIDE 500 MILLILITER(S): 9 INJECTION INTRAMUSCULAR; INTRAVENOUS; SUBCUTANEOUS at 11:35

## 2020-02-05 RX ADMIN — LISINOPRIL 20 MILLIGRAM(S): 2.5 TABLET ORAL at 17:43

## 2020-02-05 RX ADMIN — SODIUM CHLORIDE 80 MILLILITER(S): 9 INJECTION, SOLUTION INTRAVENOUS at 17:44

## 2020-02-05 RX ADMIN — OXYCODONE HYDROCHLORIDE 5 MILLIGRAM(S): 5 TABLET ORAL at 09:45

## 2020-02-05 RX ADMIN — OXYCODONE HYDROCHLORIDE 5 MILLIGRAM(S): 5 TABLET ORAL at 08:45

## 2020-02-05 RX ADMIN — OXYCODONE HYDROCHLORIDE 5 MILLIGRAM(S): 5 TABLET ORAL at 14:20

## 2020-02-05 RX ADMIN — Medication 120 MILLIGRAM(S): at 21:41

## 2020-02-05 RX ADMIN — LISINOPRIL 20 MILLIGRAM(S): 2.5 TABLET ORAL at 06:04

## 2020-02-05 RX ADMIN — Medication 1 MILLIGRAM(S): at 11:35

## 2020-02-05 RX ADMIN — PANTOPRAZOLE SODIUM 40 MILLIGRAM(S): 20 TABLET, DELAYED RELEASE ORAL at 06:04

## 2020-02-05 RX ADMIN — CARVEDILOL PHOSPHATE 25 MILLIGRAM(S): 80 CAPSULE, EXTENDED RELEASE ORAL at 06:06

## 2020-02-05 RX ADMIN — GABAPENTIN 100 MILLIGRAM(S): 400 CAPSULE ORAL at 21:41

## 2020-02-05 RX ADMIN — SIMVASTATIN 40 MILLIGRAM(S): 20 TABLET, FILM COATED ORAL at 21:41

## 2020-02-05 RX ADMIN — ENOXAPARIN SODIUM 30 MILLIGRAM(S): 100 INJECTION SUBCUTANEOUS at 17:43

## 2020-02-05 NOTE — PROGRESS NOTE ADULT - ASSESSMENT
74M w/ AF on AC, HFpEF (50%), HLD, EtOH use now POD1 R TKA    #orthostasis - likely 2/2 decreased PO intake post-op; will need to optimized BP and HR medications  #Post-Op - pain control per primary team, bowel regimen, incentive spirometry  #AFib - rate controlled on coreg. Has dual rate control medications w/ verapamil for home regimen  #EtOH - quit 1mo ago. did not appear in w/d. CIWA w/ PRN added  #HLD - c/w statin    Recommendations   - Hold lisinopril and verapamil in setting of orthostasis and lack of compensatory rise in HR   - INR in AM   - If still lightheaded can reduce gabapentin    PPx: Starting coumadin

## 2020-02-05 NOTE — PROGRESS NOTE ADULT - SUBJECTIVE AND OBJECTIVE BOX
INTERVAL HPI/OVERNIGHT EVENTS: MARIO O/N    SUBJECTIVE: Patient seen and examined at bedside.   POD1 R TKA  Pt seen approximately 915AM. c/o lightheadedness later on when working w PT. Orthostatics did show drop in SBP >20mmHg from sitting to standing w/o compensatory rise in heart rate    Patient this AM states pain is well controlled. States he sometimes has a feeling of malaise from head downwards. Denies any rhinorrea, sore throat, fever. Endorses a recent dry cough. No chest pain or dyspnea. No facial pain. eating w/o N/V/Abd pain. No numbness or weakness.    ROS: 12 point ROS reviewed and negative    OBJECTIVE:    VITAL SIGNS:  ICU Vital Signs Last 24 Hrs  T(C): 36.8 (05 Feb 2020 14:18), Max: 37.2 (05 Feb 2020 08:40)  T(F): 98.2 (05 Feb 2020 14:18), Max: 98.9 (05 Feb 2020 08:40)  HR: 89 (05 Feb 2020 17:42) (75 - 105)  BP: 132/83 (05 Feb 2020 17:42) (107/68 - 138/69)  BP(mean): --  ABP: --  ABP(mean): --  RR: 17 (05 Feb 2020 17:42) (15 - 17)  SpO2: 95% (05 Feb 2020 17:42) (93% - 98%)        02-04 @ 07:01 - 02-05 @ 07:00  --------------------------------------------------------  IN: 500 mL / OUT: 1170 mL / NET: -670 mL    02-05 @ 07:01  -  02-05 @ 17:53  --------------------------------------------------------  IN: 1160 mL / OUT: 1425 mL / NET: -265 mL      CAPILLARY BLOOD GLUCOSE      POCT Blood Glucose.: 105 mg/dL (04 Feb 2020 06:52)      PHYSICAL EXAM:  Gen: male in bed on RA in NAD  HEENT: EOMI, MMM, no sinus tenderness, no oral erythema or exudate  CV: Warm ext wo pitting edema, RRR, nml S1S2 wo murmurs or rubs  PULM: Nml effort wo accessory muscle use, clear BS in b/l lung fields wo adventitious sounds  ABD: Non distended, NABS, Soft, Nontender, No suprapubic tenderness  NEURO: Alert, oriented  Motor: moving all extremities  Sensory: soft touch intact b/l  Skin: intact      MEDICATIONS:  MEDICATIONS  (STANDING):  acetaminophen   Tablet .. 1000 milliGRAM(s) Oral once  aprepitant 40 milliGRAM(s) Oral once  ascorbic acid 500 milliGRAM(s) Oral daily  BUpivacaine liposome 1.3% Injectable (no eMAR) 20 milliLiter(s) Local Injection once  carvedilol 25 milliGRAM(s) Oral every 12 hours  celecoxib 400 milliGRAM(s) Oral once  diltiazem    milliGRAM(s) Oral daily  enoxaparin Injectable 30 milliGRAM(s) SubCutaneous every 12 hours  ferrous    sulfate 325 milliGRAM(s) Oral three times a day with meals  folic acid 1 milliGRAM(s) Oral daily  gabapentin 100 milliGRAM(s) Oral every 8 hours  gabapentin 100 milliGRAM(s) Oral once  lactated ringers. 1000 milliLiter(s) (80 mL/Hr) IV Continuous <Continuous>  lactated ringers. 1000 milliLiter(s) (100 mL/Hr) IV Continuous <Continuous>  lisinopril 20 milliGRAM(s) Oral two times a day  multivitamin 1 Tablet(s) Oral daily  pantoprazole    Tablet 40 milliGRAM(s) Oral before breakfast  polyethylene glycol 3350 17 Gram(s) Oral daily  simvastatin 40 milliGRAM(s) Oral at bedtime  warfarin 5 milliGRAM(s) Oral at bedtime    MEDICATIONS  (PRN):  acetaminophen   Tablet .. 650 milliGRAM(s) Oral every 6 hours PRN Temp greater or equal to 38C (100.4F), Mild Pain (1 - 3)  aluminum hydroxide/magnesium hydroxide/simethicone Suspension 30 milliLiter(s) Oral four times a day PRN Indigestion  HYDROmorphone  Injectable 0.5 milliGRAM(s) IV Push every 4 hours PRN Breakthrough pain only  HYDROmorphone  Injectable 0.5 milliGRAM(s) IV Push every 15 minutes PRN Breakthrough Pain  LORazepam     Tablet 0.5 milliGRAM(s) Oral every 6 hours PRN Agitation  magnesium hydroxide Suspension 30 milliLiter(s) Oral daily PRN Constipation  ondansetron Injectable 4 milliGRAM(s) IV Push every 6 hours PRN Nausea and/or Vomiting  oxyCODONE    IR 5 milliGRAM(s) Oral every 4 hours PRN Severe Pain (7 - 10)  senna 2 Tablet(s) Oral at bedtime PRN Constipation  traMADol 50 milliGRAM(s) Oral every 6 hours PRN Moderate Pain (4 - 6)  zaleplon 5 milliGRAM(s) Oral at bedtime PRN Insomnia      ALLERGIES:  Allergies    No Known Allergies    Intolerances        LABS:                        11.6   11.40 )-----------( 117      ( 05 Feb 2020 07:41 )             35.4     02-05    137  |  102  |  12  ----------------------------<  155<H>  4.2   |  22  |  0.67    Ca    9.5      05 Feb 2020 07:41      PT/INR - ( 04 Feb 2020 07:44 )   PT: 16.5 sec;   INR: 1.43          PTT - ( 04 Feb 2020 07:44 )  PTT:32.4 sec      RADIOLOGY & ADDITIONAL TESTS: Reviewed.

## 2020-02-05 NOTE — PROGRESS NOTE ADULT - SUBJECTIVE AND OBJECTIVE BOX
Orthopedics Progress Note    Procedure: right TKA   Surgeon: Dr. Torres    Pt comfortable, without complaints. Pain well controlled in PACU.   Denies CP, SOB, N/V, numbness/tingling     Vital Signs Last 24 Hrs  T(C): 36.6 (05 Feb 2020 05:00), Max: 37.1 (05 Feb 2020 00:14)  T(F): 97.8 (05 Feb 2020 05:00), Max: 98.8 (05 Feb 2020 00:14)  HR: 76 (05 Feb 2020 05:00) (61 - 90)  BP: 138/69 (05 Feb 2020 05:00) (111/63 - 162/84)  BP(mean): 114 (04 Feb 2020 12:29) (88 - 116)  RR: 16 (05 Feb 2020 05:00) (6 - 21)  SpO2: 98% (05 Feb 2020 05:00) (90% - 100%)    General: Pt Alert and oriented, comfortable  Dressing C/D/I- aquacel   Sensation intact and equal BLE   Motor ehl/ta/gs/fhl 5/5 BLE   Pulses dp palpable BLE        A/P: 74yMale POD#1 s/p right TKA   - Stable; regional per anesthesia   - Pain Control  - DVT ppx: ASA  - Jessy-op abx: Ancef  - PT, WBS:  WBAT RLE  - F/U AM Labs

## 2020-02-05 NOTE — PROGRESS NOTE ADULT - SUBJECTIVE AND OBJECTIVE BOX
Ortho Note    Pt comfortable without complaints, pain controlled  Denies CP, SOB, N/V, numbness/tingling     Vital Signs Last 24 Hrs  T(C): 37.2 (02-05-20 @ 08:40), Max: 37.2 (02-05-20 @ 08:40)  T(F): 98.9 (02-05-20 @ 08:40), Max: 98.9 (02-05-20 @ 08:40)  HR: 91 (02-05-20 @ 08:40) (91 - 91)  BP: 109/76 (02-05-20 @ 10:55) (107/68 - 109/76)  BP(mean): --  RR: 15 (02-05-20 @ 08:40) (15 - 15)  SpO2: 96% (02-05-20 @ 08:40) (96% - 96%)  AVSS    General: Pt Alert and oriented, NAD  DSG C/D/I  Pulses: +2DP, WWP feet   Sensation: SILT BLE  Motor: 5/5 EHL/FHL/TA/GS                          11.6   11.40 )-----------( 117      ( 05 Feb 2020 07:41 )             35.4   05 Feb 2020 07:41    137    |  102    |  12     ----------------------------<  155    4.2     |  22     |  0.67     Ca    9.5        05 Feb 2020 07:41        A/P: 74yMale POD#1 s/p right total knee replacement  - orthostatic hypotension- NS bolus given  - Pain Control- continue current regimen, pain well-controlled  - DVT ppx: ASA 81mg bid x 30 days  - PT, WBS: WBAT  - continue bowel regimen  - OOB for meals, I/S  - dispo: home with outpatient PT    Ortho Pager [phone] Ortho Note    Pt comfortable without complaints, pain controlled  Denies CP, SOB, N/V, numbness/tingling     Vital Signs Last 24 Hrs  T(C): 37.2 (02-05-20 @ 08:40), Max: 37.2 (02-05-20 @ 08:40)  T(F): 98.9 (02-05-20 @ 08:40), Max: 98.9 (02-05-20 @ 08:40)  HR: 91 (02-05-20 @ 08:40) (91 - 91)  BP: 109/76 (02-05-20 @ 10:55) (107/68 - 109/76)  BP(mean): --  RR: 15 (02-05-20 @ 08:40) (15 - 15)  SpO2: 96% (02-05-20 @ 08:40) (96% - 96%)  AVSS    General: Pt Alert and oriented, NAD  DSG C/D/I  Pulses: +2DP, WWP feet   Sensation: SILT BLE  Motor: 5/5 EHL/FHL/TA/GS                          11.6   11.40 )-----------( 117      ( 05 Feb 2020 07:41 )             35.4   05 Feb 2020 07:41    137    |  102    |  12     ----------------------------<  155    4.2     |  22     |  0.67     Ca    9.5        05 Feb 2020 07:41        A/P: 74yMale POD#1 s/p right total knee replacement  - orthostatic hypotension- NS bolus given  - Pain Control- continue current regimen, pain well-controlled  - DVT ppx: lovenox 30mg bid + coumadin 5mg qd until therapeutic; Then transition to just coumadin. Patient has follow-up at [facility] in [facility] Tuesday 2/11 to follow-up with NP who prescribes his coumadin and follows his INR.   - PT, WBS: WBAT  - continue bowel regimen  - OOB for meals, I/S  - dispo: home with outpatient PT    Ortho Pager [phone]

## 2020-02-06 LAB
ALBUMIN SERPL ELPH-MCNC: 3.5 G/DL — SIGNIFICANT CHANGE UP (ref 3.3–5)
ALP SERPL-CCNC: 52 U/L — SIGNIFICANT CHANGE UP (ref 40–120)
ALT FLD-CCNC: 10 U/L — SIGNIFICANT CHANGE UP (ref 10–45)
ANION GAP SERPL CALC-SCNC: 8 MMOL/L — SIGNIFICANT CHANGE UP (ref 5–17)
AST SERPL-CCNC: 17 U/L — SIGNIFICANT CHANGE UP (ref 10–40)
BILIRUB SERPL-MCNC: 0.6 MG/DL — SIGNIFICANT CHANGE UP (ref 0.2–1.2)
BUN SERPL-MCNC: 11 MG/DL — SIGNIFICANT CHANGE UP (ref 7–23)
CALCIUM SERPL-MCNC: 9 MG/DL — SIGNIFICANT CHANGE UP (ref 8.4–10.5)
CHLORIDE SERPL-SCNC: 102 MMOL/L — SIGNIFICANT CHANGE UP (ref 96–108)
CO2 SERPL-SCNC: 26 MMOL/L — SIGNIFICANT CHANGE UP (ref 22–31)
CREAT SERPL-MCNC: 0.75 MG/DL — SIGNIFICANT CHANGE UP (ref 0.5–1.3)
GLUCOSE SERPL-MCNC: 118 MG/DL — HIGH (ref 70–99)
HCT VFR BLD CALC: 33.1 % — LOW (ref 39–50)
HGB BLD-MCNC: 10.9 G/DL — LOW (ref 13–17)
INR BLD: 1.33 — HIGH (ref 0.88–1.16)
MCHC RBC-ENTMCNC: 29.1 PG — SIGNIFICANT CHANGE UP (ref 27–34)
MCHC RBC-ENTMCNC: 32.9 GM/DL — SIGNIFICANT CHANGE UP (ref 32–36)
MCV RBC AUTO: 88.3 FL — SIGNIFICANT CHANGE UP (ref 80–100)
NRBC # BLD: 0 /100 WBCS — SIGNIFICANT CHANGE UP (ref 0–0)
PLATELET # BLD AUTO: 106 K/UL — LOW (ref 150–400)
POTASSIUM SERPL-MCNC: 4.5 MMOL/L — SIGNIFICANT CHANGE UP (ref 3.5–5.3)
POTASSIUM SERPL-SCNC: 4.5 MMOL/L — SIGNIFICANT CHANGE UP (ref 3.5–5.3)
PROT SERPL-MCNC: 6.3 G/DL — SIGNIFICANT CHANGE UP (ref 6–8.3)
PROTHROM AB SERPL-ACNC: 15.3 SEC — HIGH (ref 10–12.9)
RBC # BLD: 3.75 M/UL — LOW (ref 4.2–5.8)
RBC # FLD: 13.5 % — SIGNIFICANT CHANGE UP (ref 10.3–14.5)
SODIUM SERPL-SCNC: 136 MMOL/L — SIGNIFICANT CHANGE UP (ref 135–145)
WBC # BLD: 8.86 K/UL — SIGNIFICANT CHANGE UP (ref 3.8–10.5)
WBC # FLD AUTO: 8.86 K/UL — SIGNIFICANT CHANGE UP (ref 3.8–10.5)

## 2020-02-06 PROCEDURE — 99233 SBSQ HOSP IP/OBS HIGH 50: CPT

## 2020-02-06 RX ORDER — DILTIAZEM HCL 120 MG
120 CAPSULE, EXT RELEASE 24 HR ORAL DAILY
Refills: 0 | Status: DISCONTINUED | OUTPATIENT
Start: 2020-02-06 | End: 2020-02-07

## 2020-02-06 RX ORDER — RAMIPRIL 5 MG
1 CAPSULE ORAL
Qty: 0 | Refills: 0 | DISCHARGE

## 2020-02-06 RX ORDER — WARFARIN SODIUM 2.5 MG/1
5 TABLET ORAL AT BEDTIME
Refills: 0 | Status: DISCONTINUED | OUTPATIENT
Start: 2020-02-06 | End: 2020-02-06

## 2020-02-06 RX ORDER — LISINOPRIL 2.5 MG/1
20 TABLET ORAL
Refills: 0 | Status: DISCONTINUED | OUTPATIENT
Start: 2020-02-06 | End: 2020-02-06

## 2020-02-06 RX ORDER — WARFARIN SODIUM 2.5 MG/1
5 TABLET ORAL AT BEDTIME
Refills: 0 | Status: COMPLETED | OUTPATIENT
Start: 2020-02-06 | End: 2020-02-06

## 2020-02-06 RX ADMIN — CELECOXIB 200 MILLIGRAM(S): 200 CAPSULE ORAL at 05:35

## 2020-02-06 RX ADMIN — Medication 325 MILLIGRAM(S): at 07:07

## 2020-02-06 RX ADMIN — WARFARIN SODIUM 5 MILLIGRAM(S): 2.5 TABLET ORAL at 21:59

## 2020-02-06 RX ADMIN — CELECOXIB 200 MILLIGRAM(S): 200 CAPSULE ORAL at 19:16

## 2020-02-06 RX ADMIN — GABAPENTIN 100 MILLIGRAM(S): 400 CAPSULE ORAL at 21:59

## 2020-02-06 RX ADMIN — Medication 500 MILLIGRAM(S): at 07:40

## 2020-02-06 RX ADMIN — SIMVASTATIN 40 MILLIGRAM(S): 20 TABLET, FILM COATED ORAL at 21:59

## 2020-02-06 RX ADMIN — PANTOPRAZOLE SODIUM 40 MILLIGRAM(S): 20 TABLET, DELAYED RELEASE ORAL at 05:35

## 2020-02-06 RX ADMIN — CELECOXIB 200 MILLIGRAM(S): 200 CAPSULE ORAL at 06:35

## 2020-02-06 RX ADMIN — Medication 650 MILLIGRAM(S): at 11:07

## 2020-02-06 RX ADMIN — TRAMADOL HYDROCHLORIDE 50 MILLIGRAM(S): 50 TABLET ORAL at 13:48

## 2020-02-06 RX ADMIN — ENOXAPARIN SODIUM 30 MILLIGRAM(S): 100 INJECTION SUBCUTANEOUS at 18:16

## 2020-02-06 RX ADMIN — Medication 325 MILLIGRAM(S): at 18:16

## 2020-02-06 RX ADMIN — Medication 1 TABLET(S): at 11:40

## 2020-02-06 RX ADMIN — GABAPENTIN 100 MILLIGRAM(S): 400 CAPSULE ORAL at 13:48

## 2020-02-06 RX ADMIN — ENOXAPARIN SODIUM 30 MILLIGRAM(S): 100 INJECTION SUBCUTANEOUS at 05:34

## 2020-02-06 RX ADMIN — CARVEDILOL PHOSPHATE 25 MILLIGRAM(S): 80 CAPSULE, EXTENDED RELEASE ORAL at 18:16

## 2020-02-06 RX ADMIN — GABAPENTIN 100 MILLIGRAM(S): 400 CAPSULE ORAL at 05:35

## 2020-02-06 RX ADMIN — OXYCODONE HYDROCHLORIDE 5 MILLIGRAM(S): 5 TABLET ORAL at 07:40

## 2020-02-06 RX ADMIN — Medication 650 MILLIGRAM(S): at 23:03

## 2020-02-06 RX ADMIN — TRAMADOL HYDROCHLORIDE 50 MILLIGRAM(S): 50 TABLET ORAL at 14:48

## 2020-02-06 RX ADMIN — Medication 325 MILLIGRAM(S): at 11:40

## 2020-02-06 RX ADMIN — TRAMADOL HYDROCHLORIDE 50 MILLIGRAM(S): 50 TABLET ORAL at 23:02

## 2020-02-06 RX ADMIN — Medication 650 MILLIGRAM(S): at 10:07

## 2020-02-06 RX ADMIN — Medication 5 MILLIGRAM(S): at 01:03

## 2020-02-06 RX ADMIN — Medication 1 MILLIGRAM(S): at 11:39

## 2020-02-06 RX ADMIN — OXYCODONE HYDROCHLORIDE 5 MILLIGRAM(S): 5 TABLET ORAL at 08:10

## 2020-02-06 RX ADMIN — LISINOPRIL 20 MILLIGRAM(S): 2.5 TABLET ORAL at 05:35

## 2020-02-06 RX ADMIN — CELECOXIB 200 MILLIGRAM(S): 200 CAPSULE ORAL at 18:16

## 2020-02-06 RX ADMIN — CARVEDILOL PHOSPHATE 25 MILLIGRAM(S): 80 CAPSULE, EXTENDED RELEASE ORAL at 05:35

## 2020-02-06 NOTE — DIETITIAN INITIAL EVALUATION ADULT. - ENERGY NEEDS
Ht (2/4): 175.3cm, Wt (2/4): 92.1kg, IBW: 72.7kg+/-10%, %IBW: 126%, BMI: 30   IBW used to calculate energy needs due to pt's current body weight exceeding 120% of IBW. Needs adjusted for age, post-op. Aim for higher end of kcal range.

## 2020-02-06 NOTE — DIETITIAN INITIAL EVALUATION ADULT. - OTHER INFO
74yMale hx afib, CAD, HTN, HLD, cardiomyopathy, alcoholism, now s/p right total knee replacement on 2/4. Pt resting in bed, pain controlled at this time, denies N/V, last BM 2/3 per pt (reports regular BM PTA). Aram score 20. Pt with good appetite now and PTA, consuming >75% of meals at this time, denies food allergies, denies following any dietary restrictions despite knowing he should be. Pt states he stopped drinking alcohol ~1 month ago. Pt states weight typically fluctuates between 201-205lb, no major weight changes PTA per pt. Encouraged pt to follow a DASH/TLC diet 2/2 hx HTN, HLD, CAD. Reviewed interaction between Warfarin and foods rich in vitamin K as pt states he is aware of this. Encouraged adequate lean protein intake post-op for healing- pt appeared receptive to recommendations/education. Please see below for full nutritional recommendations- d/w team. RD to monitor and f/u per protocol.

## 2020-02-06 NOTE — PROGRESS NOTE ADULT - ASSESSMENT
74M w/ AF on AC, HFpEF (50%), HLD, EtOH use now POD2 R TKA    #Post op state - Pain mgmt per primary team, bowel regimen, ppx w/ coumadin BID; on SQL  #Orthostasis - Given fluids o/n. Reassess when working w PT today  #AFib - Rate controlled  #EtOH - quit drinking 1mo ago. Does not appear to be in w/d  #HLD - c/ w statin  #BPH - has been chronic based on description of sxs. Can trial flomax but pt prefers defering to outpt    Recommendations   - Pt likely has chronic BPH - would recommend follow-up as outpatient provider w/ Dr. Daley. Discussed increased risk of falls in setting of BPH w/ recent operation and recommended flomax but pt feels sxs have been stable.   - Re-evaluate orthostasis today   - If still orthostatic, would dc lisinopril. Pt and wife aware this should not affect his HR

## 2020-02-06 NOTE — DIETITIAN INITIAL EVALUATION ADULT. - PERTINENT MEDS FT
Warfarin, LR@80mL/hr, bowel regimen, Cardizem, Zestril, vitamin C, Coreg, Ferrous sulf, folic acid, pain regimen, MVI, Zofran, Protonix DR, Zocor

## 2020-02-06 NOTE — PROGRESS NOTE ADULT - SUBJECTIVE AND OBJECTIVE BOX
Ortho Note    Pt comfortable without complaints, pain controlled  Denies CP, SOB, N/V, numbness/tingling. Reports feeling  "better" than yesterday. Has no IV access, and refuses at this time,  but states has been drinking plenty of water and gatorade, and   urinating adequately.    Vital Signs Last 24 Hrs  T(C): 37.1 (02-06-20 @ 05:29), Max: 37.1 (02-06-20 @ 05:29)  T(F): 98.8 (02-06-20 @ 05:29), Max: 98.8 (02-06-20 @ 05:29)  HR: 91 (02-06-20 @ 05:29) (91 - 91)  BP: 132/90 (02-06-20 @ 05:29) (132/90 - 132/90)  BP(mean): --  RR: 17 (02-06-20 @ 05:29) (17 - 17)  SpO2: 96% (02-06-20 @ 05:29) (96% - 96%)  AVSS    focused exam:  General: Pt Alert and oriented, NAD  DSG C/D/I  Pulses: +2DP, WWP feet  Sensation: SILT BLE  Motor: 5/5 EHL/FHL/TA/GS                          10.9   8.86  )-----------( 106      ( 06 Feb 2020 07:14 )             33.1   06 Feb 2020 07:15    136    |  102    |  11     ----------------------------<  118    4.5     |  26     |  0.75     Ca    9.0        06 Feb 2020 07:15    TPro  6.3    /  Alb  3.5    /  TBili  0.6    /  DBili  x      /  AST  17     /  ALT  10     /  AlkPhos  52     06 Feb 2020 07:15      A/P: 74yMale POD#2 s/p right total knee replacement  - Labs WNL, VSS  - orthostatic hypotension- currently asymptomatic, hold parameters added for antihypertensives; continue PO hydration; minimize narcotics (tramadol / tylenol encouraged)  - Pain Control  - DVT ppx: lovenox 30mg bid + home med coumadin 5mg until INR therapeutic. MALENA Paris ([facility]) will follow patient. States to allow patient to take coumadin 5mg qd through Chemo 2/10, and then return to regular schedule (M,W,F 2.5mg/ Tue,Th,Sa, Perez 5mg). Will follow-up in her office for INR 2/11  - PT, WBS: WBAT  -continue bowel regimen  - OOB for meals, I/S  - dispo: Home with outpatient PT, pending PT clearance    Ortho Pager [phone]

## 2020-02-06 NOTE — PROGRESS NOTE ADULT - SUBJECTIVE AND OBJECTIVE BOX
INTERVAL HPI/OVERNIGHT EVENTS: L Wrist IV infiltrated; IV since removed.     SUBJECTIVE: Patient seen and examined at bedside.     Pt states he is feeling better than yesterday. States he felt lightheaded working w/ PT yesterday. Denies accompanying chest pain, dyspnea, palpitations. States L hand was uncomfortable but this resolved after IV removed. Has residual swelling but denies any pain, fever, numbness.    Of note, reports stable urinary frequency and urgency over last several years. Reports nocturia 2x/night. State he had CARMEN and PSA which were nml. Has not tried flomax/finasteride in the past. Overall feels no changes in severity of sxs at this time.    ROS: 12 point ROS reviewed and negative    OBJECTIVE:    VITAL SIGNS:  ICU Vital Signs Last 24 Hrs  T(C): 37.1 (06 Feb 2020 08:31), Max: 37.1 (06 Feb 2020 05:29)  T(F): 98.7 (06 Feb 2020 08:31), Max: 98.8 (06 Feb 2020 05:29)  HR: 81 (06 Feb 2020 08:31) (81 - 105)  BP: 128/85 (06 Feb 2020 08:31) (109/76 - 132/90)  BP(mean): --  ABP: --  ABP(mean): --  RR: 15 (06 Feb 2020 08:31) (15 - 17)  SpO2: 94% (06 Feb 2020 08:31) (94% - 96%)        02-05 @ 07:01  -  02-06 @ 07:00  --------------------------------------------------------  IN: 1160 mL / OUT: 2425 mL / NET: -1265 mL      CAPILLARY BLOOD GLUCOSE          PHYSICAL EXAM:  Gen: male in bed on RA in NAD  HEENT: EOMI, MMM, no sinus tenderness, no oral erythema or exudate  CV: BLEwo pitting edema, RRR, nml S1S2 wo murmurs or rubs  Ext: swelling present in L Hand, No pitting, no redness, increased warmth, or tenderness  PULM: Nml effort wo accessory muscle use, clear BS in b/l lung fields wo adventitious sounds  ABD: Non distended, NABS, Soft, Nontender, No suprapubic tenderness  NEURO: Alert, oriented  Motor: moving all extremities  Sensory: soft touch intact b/l  Skin: intact  MSK: brace on R knee, performing knee exercises     MEDICATIONS:  MEDICATIONS  (STANDING):  acetaminophen   Tablet .. 1000 milliGRAM(s) Oral once  aprepitant 40 milliGRAM(s) Oral once  ascorbic acid 500 milliGRAM(s) Oral daily  BUpivacaine liposome 1.3% Injectable (no eMAR) 20 milliLiter(s) Local Injection once  carvedilol 25 milliGRAM(s) Oral every 12 hours  celecoxib 200 milliGRAM(s) Oral two times a day  celecoxib 400 milliGRAM(s) Oral once  diltiazem    milliGRAM(s) Oral daily  enoxaparin Injectable 30 milliGRAM(s) SubCutaneous every 12 hours  ferrous    sulfate 325 milliGRAM(s) Oral three times a day with meals  folic acid 1 milliGRAM(s) Oral daily  gabapentin 100 milliGRAM(s) Oral every 8 hours  gabapentin 100 milliGRAM(s) Oral once  lactated ringers. 1000 milliLiter(s) (80 mL/Hr) IV Continuous <Continuous>  lisinopril 20 milliGRAM(s) Oral two times a day  multivitamin 1 Tablet(s) Oral daily  pantoprazole    Tablet 40 milliGRAM(s) Oral before breakfast  polyethylene glycol 3350 17 Gram(s) Oral daily  simvastatin 40 milliGRAM(s) Oral at bedtime  warfarin 5 milliGRAM(s) Oral at bedtime    MEDICATIONS  (PRN):  acetaminophen   Tablet .. 650 milliGRAM(s) Oral every 6 hours PRN Temp greater or equal to 38C (100.4F), Mild Pain (1 - 3)  aluminum hydroxide/magnesium hydroxide/simethicone Suspension 30 milliLiter(s) Oral four times a day PRN Indigestion  bisacodyl Suppository 10 milliGRAM(s) Rectal daily PRN If no bowel movement by postoperative day #2  HYDROmorphone  Injectable 0.5 milliGRAM(s) IV Push every 4 hours PRN Breakthrough pain only  HYDROmorphone  Injectable 0.5 milliGRAM(s) IV Push every 15 minutes PRN Breakthrough Pain  LORazepam     Tablet 0.5 milliGRAM(s) Oral every 6 hours PRN Agitation  magnesium hydroxide Suspension 30 milliLiter(s) Oral daily PRN Constipation  ondansetron Injectable 4 milliGRAM(s) IV Push every 6 hours PRN Nausea and/or Vomiting  oxyCODONE    IR 5 milliGRAM(s) Oral every 4 hours PRN Severe Pain (7 - 10)  senna 2 Tablet(s) Oral at bedtime PRN Constipation  traMADol 50 milliGRAM(s) Oral every 6 hours PRN Moderate Pain (4 - 6)  zaleplon 5 milliGRAM(s) Oral at bedtime PRN Insomnia      ALLERGIES:  Allergies    No Known Allergies    Intolerances        LABS:                        10.9   8.86  )-----------( 106      ( 06 Feb 2020 07:14 )             33.1     02-06    136  |  102  |  11  ----------------------------<  118<H>  4.5   |  26  |  0.75    Ca    9.0      06 Feb 2020 07:15    TPro  6.3  /  Alb  3.5  /  TBili  0.6  /  DBili  x   /  AST  17  /  ALT  10  /  AlkPhos  52  02-06    PT/INR - ( 06 Feb 2020 07:14 )   PT: 15.3 sec;   INR: 1.33                RADIOLOGY & ADDITIONAL TESTS: Reviewed.

## 2020-02-07 VITALS — SYSTOLIC BLOOD PRESSURE: 102 MMHG | DIASTOLIC BLOOD PRESSURE: 66 MMHG | HEART RATE: 90 BPM

## 2020-02-07 LAB
ANION GAP SERPL CALC-SCNC: 10 MMOL/L — SIGNIFICANT CHANGE UP (ref 5–17)
BUN SERPL-MCNC: 9 MG/DL — SIGNIFICANT CHANGE UP (ref 7–23)
CALCIUM SERPL-MCNC: 9.2 MG/DL — SIGNIFICANT CHANGE UP (ref 8.4–10.5)
CHLORIDE SERPL-SCNC: 98 MMOL/L — SIGNIFICANT CHANGE UP (ref 96–108)
CO2 SERPL-SCNC: 26 MMOL/L — SIGNIFICANT CHANGE UP (ref 22–31)
CREAT SERPL-MCNC: 0.65 MG/DL — SIGNIFICANT CHANGE UP (ref 0.5–1.3)
GLUCOSE SERPL-MCNC: 114 MG/DL — HIGH (ref 70–99)
HCT VFR BLD CALC: 33.5 % — LOW (ref 39–50)
HGB BLD-MCNC: 10.7 G/DL — LOW (ref 13–17)
MCHC RBC-ENTMCNC: 28.5 PG — SIGNIFICANT CHANGE UP (ref 27–34)
MCHC RBC-ENTMCNC: 31.9 GM/DL — LOW (ref 32–36)
MCV RBC AUTO: 89.3 FL — SIGNIFICANT CHANGE UP (ref 80–100)
NRBC # BLD: 0 /100 WBCS — SIGNIFICANT CHANGE UP (ref 0–0)
PLATELET # BLD AUTO: 106 K/UL — LOW (ref 150–400)
POTASSIUM SERPL-MCNC: 4.2 MMOL/L — SIGNIFICANT CHANGE UP (ref 3.5–5.3)
POTASSIUM SERPL-SCNC: 4.2 MMOL/L — SIGNIFICANT CHANGE UP (ref 3.5–5.3)
RBC # BLD: 3.75 M/UL — LOW (ref 4.2–5.8)
RBC # FLD: 13.2 % — SIGNIFICANT CHANGE UP (ref 10.3–14.5)
SODIUM SERPL-SCNC: 134 MMOL/L — LOW (ref 135–145)
WBC # BLD: 6.68 K/UL — SIGNIFICANT CHANGE UP (ref 3.8–10.5)
WBC # FLD AUTO: 6.68 K/UL — SIGNIFICANT CHANGE UP (ref 3.8–10.5)

## 2020-02-07 PROCEDURE — 99233 SBSQ HOSP IP/OBS HIGH 50: CPT

## 2020-02-07 PROCEDURE — 99238 HOSP IP/OBS DSCHRG MGMT 30/<: CPT

## 2020-02-07 RX ADMIN — GABAPENTIN 100 MILLIGRAM(S): 400 CAPSULE ORAL at 05:29

## 2020-02-07 RX ADMIN — OXYCODONE HYDROCHLORIDE 5 MILLIGRAM(S): 5 TABLET ORAL at 09:42

## 2020-02-07 RX ADMIN — Medication 325 MILLIGRAM(S): at 13:09

## 2020-02-07 RX ADMIN — Medication 5 MILLIGRAM(S): at 01:42

## 2020-02-07 RX ADMIN — OXYCODONE HYDROCHLORIDE 5 MILLIGRAM(S): 5 TABLET ORAL at 08:42

## 2020-02-07 RX ADMIN — Medication 650 MILLIGRAM(S): at 08:42

## 2020-02-07 RX ADMIN — TRAMADOL HYDROCHLORIDE 50 MILLIGRAM(S): 50 TABLET ORAL at 00:02

## 2020-02-07 RX ADMIN — Medication 1 MILLIGRAM(S): at 13:09

## 2020-02-07 RX ADMIN — Medication 325 MILLIGRAM(S): at 07:24

## 2020-02-07 RX ADMIN — Medication 1 TABLET(S): at 13:09

## 2020-02-07 RX ADMIN — ENOXAPARIN SODIUM 30 MILLIGRAM(S): 100 INJECTION SUBCUTANEOUS at 05:28

## 2020-02-07 RX ADMIN — Medication 120 MILLIGRAM(S): at 05:29

## 2020-02-07 RX ADMIN — CELECOXIB 200 MILLIGRAM(S): 200 CAPSULE ORAL at 06:29

## 2020-02-07 RX ADMIN — PANTOPRAZOLE SODIUM 40 MILLIGRAM(S): 20 TABLET, DELAYED RELEASE ORAL at 05:29

## 2020-02-07 RX ADMIN — CELECOXIB 200 MILLIGRAM(S): 200 CAPSULE ORAL at 05:29

## 2020-02-07 RX ADMIN — Medication 650 MILLIGRAM(S): at 00:03

## 2020-02-07 RX ADMIN — Medication 650 MILLIGRAM(S): at 09:42

## 2020-02-07 RX ADMIN — Medication 500 MILLIGRAM(S): at 07:24

## 2020-02-07 RX ADMIN — CARVEDILOL PHOSPHATE 25 MILLIGRAM(S): 80 CAPSULE, EXTENDED RELEASE ORAL at 05:28

## 2020-02-07 RX ADMIN — GABAPENTIN 100 MILLIGRAM(S): 400 CAPSULE ORAL at 13:09

## 2020-02-07 NOTE — PROGRESS NOTE ADULT - ASSESSMENT
74M w/ AF on AC, HFpEF (50%), HLD, EtOH use now POD3 R TKA    #Post op state - Pain mgmt per primary team, bowel regimen w miralax, ppx w/ coumadin BID; on SQL  #Orthostasis -Improved. Holding home ACEi  #AFib - Rate controlled  #EtOH - quit drinking 1mo ago. Does not appear to be in w/d  #HLD - c/ w statin  #BPH - has been chronic based on description of sxs. Can trial flomax but pt prefers defering to outpt    Recommendations   - Pt likely has chronic BPH - would recommend follow-up as outpatient provider w/ Dr. Daley. Discussed increased risk of falls in setting of BPH w/ recent operation and recommended flomax but pt feels sxs have been stable.   - Home ACEi held. Instructed pt to restart after discussing w/ outpatient provider on follow-up

## 2020-02-07 NOTE — PROGRESS NOTE ADULT - SUBJECTIVE AND OBJECTIVE BOX
Orthopedics Progress Note    Procedure: right TKA   Surgeon: Dr. Torres    Pt comfortable, without complaints. Pain well controlled in PACU.   Denies CP, SOB, N/V, numbness/tingling     Vital Signs Last 24 Hrs  T(C): 36.9 (07 Feb 2020 05:33), Max: 37.1 (06 Feb 2020 08:31)  T(F): 98.5 (07 Feb 2020 05:33), Max: 98.7 (06 Feb 2020 08:31)  HR: 91 (07 Feb 2020 05:33) (81 - 95)  BP: 126/86 (07 Feb 2020 05:33) (94/63 - 134/89)  BP(mean): --  RR: 16 (07 Feb 2020 05:33) (15 - 18)  SpO2: 94% (07 Feb 2020 05:33) (94% - 96%)    General: Pt Alert and oriented, comfortable  Dressing C/D/I- aquacel   Sensation intact and equal BLE   Motor ehl/ta/gs/fhl 5/5 BLE   Pulses dp palpable BLE        A/P: 74yMale POD#3 s/p right TKA   - Stable; regional per anesthesia   - Pain Control  - DVT ppx: ASA  - Jessy-op abx: Ancef  - PT, WBS:  WBAT RLE  - F/U AM Labs

## 2020-02-07 NOTE — DISCHARGE NOTE NURSING/CASE MANAGEMENT/SOCIAL WORK - PATIENT PORTAL LINK FT
You can access the FollowMyHealth Patient Portal offered by [facility] by registering at the following website: [email or web address]. By joining [employer]’s FollowMyHealth portal, you will also be able to view your health information using other applications (apps) compatible with our system.

## 2020-02-07 NOTE — PROGRESS NOTE ADULT - SUBJECTIVE AND OBJECTIVE BOX
INTERVAL HPI/OVERNIGHT EVENTS: MARIO O/N    SUBJECTIVE: Patient seen and examined at bedside.   Pt feels ready to return home today. Pain is controlled. No further lightheadedness while working w PT. Eating w/o N/V, no chest pain or dyspnea. No BM yet    ROS: 12 point ROS reviewed and negative    OBJECTIVE:    VITAL SIGNS:  ICU Vital Signs Last 24 Hrs  T(C): 36.7 (07 Feb 2020 08:36), Max: 37 (06 Feb 2020 17:26)  T(F): 98.1 (07 Feb 2020 08:36), Max: 98.6 (06 Feb 2020 17:26)  HR: 90 (07 Feb 2020 10:30) (82 - 95)  BP: 102/66 (07 Feb 2020 10:30) (102/66 - 134/89)  BP(mean): --  ABP: --  ABP(mean): --  RR: 16 (07 Feb 2020 08:36) (16 - 18)  SpO2: 95% (07 Feb 2020 08:36) (94% - 96%)        02-06 @ 07:01  -  02-07 @ 07:00  --------------------------------------------------------  IN: 0 mL / OUT: 2600 mL / NET: -2600 mL      CAPILLARY BLOOD GLUCOSE          PHYSICAL EXAM:  Gen: male in bed on RA in NAD  HEENT: EOMI, MMM, no sinus tenderness, no oral erythema or exudate  CV: BLEwo pitting edema, RRR, nml S1S2 wo murmurs or rubs  Ext: swelling present in L Hand, No pitting, no redness, increased warmth, or tenderness  PULM: Nml effort wo accessory muscle use, clear BS in b/l lung fields wo adventitious sounds  ABD: Non distended, NABS, Soft, Nontender, No suprapubic tenderness  NEURO: Alert, oriented  Motor: moving all extremities  Sensory: soft touch intact b/l  Skin: intact  MSK: brace on R knee, performing knee exercises     MEDICATIONS:  MEDICATIONS  (STANDING):  acetaminophen   Tablet .. 1000 milliGRAM(s) Oral once  aprepitant 40 milliGRAM(s) Oral once  ascorbic acid 500 milliGRAM(s) Oral daily  BUpivacaine liposome 1.3% Injectable (no eMAR) 20 milliLiter(s) Local Injection once  carvedilol 25 milliGRAM(s) Oral every 12 hours  celecoxib 200 milliGRAM(s) Oral two times a day  celecoxib 400 milliGRAM(s) Oral once  diltiazem    milliGRAM(s) Oral daily  enoxaparin Injectable 30 milliGRAM(s) SubCutaneous every 12 hours  ferrous    sulfate 325 milliGRAM(s) Oral three times a day with meals  folic acid 1 milliGRAM(s) Oral daily  gabapentin 100 milliGRAM(s) Oral every 8 hours  gabapentin 100 milliGRAM(s) Oral once  lactated ringers. 1000 milliLiter(s) (80 mL/Hr) IV Continuous <Continuous>  multivitamin 1 Tablet(s) Oral daily  pantoprazole    Tablet 40 milliGRAM(s) Oral before breakfast  polyethylene glycol 3350 17 Gram(s) Oral daily  simvastatin 40 milliGRAM(s) Oral at bedtime    MEDICATIONS  (PRN):  acetaminophen   Tablet .. 650 milliGRAM(s) Oral every 6 hours PRN Temp greater or equal to 38C (100.4F), Mild Pain (1 - 3)  aluminum hydroxide/magnesium hydroxide/simethicone Suspension 30 milliLiter(s) Oral four times a day PRN Indigestion  bisacodyl Suppository 10 milliGRAM(s) Rectal daily PRN If no bowel movement by postoperative day #2  HYDROmorphone  Injectable 0.5 milliGRAM(s) IV Push every 4 hours PRN Breakthrough pain only  HYDROmorphone  Injectable 0.5 milliGRAM(s) IV Push every 15 minutes PRN Breakthrough Pain  LORazepam     Tablet 0.5 milliGRAM(s) Oral every 6 hours PRN Agitation  magnesium hydroxide Suspension 30 milliLiter(s) Oral daily PRN Constipation  ondansetron Injectable 4 milliGRAM(s) IV Push every 6 hours PRN Nausea and/or Vomiting  oxyCODONE    IR 5 milliGRAM(s) Oral every 4 hours PRN Severe Pain (7 - 10)  senna 2 Tablet(s) Oral at bedtime PRN Constipation  traMADol 50 milliGRAM(s) Oral every 6 hours PRN Moderate Pain (4 - 6)  zaleplon 5 milliGRAM(s) Oral at bedtime PRN Insomnia      ALLERGIES:  Allergies    No Known Allergies    Intolerances        LABS:                        10.7   6.68  )-----------( 106      ( 07 Feb 2020 07:50 )             33.5     02-07    134<L>  |  98  |  9   ----------------------------<  114<H>  4.2   |  26  |  0.65    Ca    9.2      07 Feb 2020 07:50    TPro  6.3  /  Alb  3.5  /  TBili  0.6  /  DBili  x   /  AST  17  /  ALT  10  /  AlkPhos  52  02-06    PT/INR - ( 06 Feb 2020 07:14 )   PT: 15.3 sec;   INR: 1.33                RADIOLOGY & ADDITIONAL TESTS: Reviewed.

## 2020-02-10 PROCEDURE — C1776: CPT

## 2020-02-10 PROCEDURE — 80048 BASIC METABOLIC PNL TOTAL CA: CPT

## 2020-02-10 PROCEDURE — 73560 X-RAY EXAM OF KNEE 1 OR 2: CPT

## 2020-02-10 PROCEDURE — 86901 BLOOD TYPING SEROLOGIC RH(D): CPT

## 2020-02-10 PROCEDURE — 82962 GLUCOSE BLOOD TEST: CPT

## 2020-02-10 PROCEDURE — 80053 COMPREHEN METABOLIC PANEL: CPT

## 2020-02-10 PROCEDURE — 85730 THROMBOPLASTIN TIME PARTIAL: CPT

## 2020-02-10 PROCEDURE — S2900: CPT

## 2020-02-10 PROCEDURE — 97116 GAIT TRAINING THERAPY: CPT

## 2020-02-10 PROCEDURE — 85027 COMPLETE CBC AUTOMATED: CPT

## 2020-02-10 PROCEDURE — 97162 PT EVAL MOD COMPLEX 30 MIN: CPT

## 2020-02-10 PROCEDURE — 36415 COLL VENOUS BLD VENIPUNCTURE: CPT

## 2020-02-10 PROCEDURE — 86850 RBC ANTIBODY SCREEN: CPT

## 2020-02-10 PROCEDURE — 85610 PROTHROMBIN TIME: CPT

## 2020-02-10 PROCEDURE — 86803 HEPATITIS C AB TEST: CPT

## 2020-02-10 PROCEDURE — C1713: CPT

## 2020-02-10 PROCEDURE — 97530 THERAPEUTIC ACTIVITIES: CPT

## 2020-02-13 DIAGNOSIS — I25.10 ATHEROSCLEROTIC HEART DISEASE OF NATIVE CORONARY ARTERY WITHOUT ANGINA PECTORIS: ICD-10-CM

## 2020-02-13 DIAGNOSIS — R35.0 FREQUENCY OF MICTURITION: ICD-10-CM

## 2020-02-13 DIAGNOSIS — M17.11 UNILATERAL PRIMARY OSTEOARTHRITIS, RIGHT KNEE: ICD-10-CM

## 2020-02-13 DIAGNOSIS — E86.0 DEHYDRATION: ICD-10-CM

## 2020-02-13 DIAGNOSIS — F10.21 ALCOHOL DEPENDENCE, IN REMISSION: ICD-10-CM

## 2020-02-13 DIAGNOSIS — R35.1 NOCTURIA: ICD-10-CM

## 2020-02-13 DIAGNOSIS — I11.0 HYPERTENSIVE HEART DISEASE WITH HEART FAILURE: ICD-10-CM

## 2020-02-13 DIAGNOSIS — I48.21 PERMANENT ATRIAL FIBRILLATION: ICD-10-CM

## 2020-02-13 DIAGNOSIS — N40.1 BENIGN PROSTATIC HYPERPLASIA WITH LOWER URINARY TRACT SYMPTOMS: ICD-10-CM

## 2020-02-13 DIAGNOSIS — E78.5 HYPERLIPIDEMIA, UNSPECIFIED: ICD-10-CM

## 2020-02-13 DIAGNOSIS — I50.30 UNSPECIFIED DIASTOLIC (CONGESTIVE) HEART FAILURE: ICD-10-CM

## 2020-02-13 DIAGNOSIS — R39.15 URGENCY OF URINATION: ICD-10-CM

## 2020-02-13 DIAGNOSIS — Z79.01 LONG TERM (CURRENT) USE OF ANTICOAGULANTS: ICD-10-CM

## 2020-02-13 DIAGNOSIS — I34.0 NONRHEUMATIC MITRAL (VALVE) INSUFFICIENCY: ICD-10-CM

## 2020-02-13 DIAGNOSIS — K21.9 GASTRO-ESOPHAGEAL REFLUX DISEASE WITHOUT ESOPHAGITIS: ICD-10-CM

## 2020-02-13 DIAGNOSIS — I42.6 ALCOHOLIC CARDIOMYOPATHY: ICD-10-CM

## 2020-02-13 DIAGNOSIS — I95.1 ORTHOSTATIC HYPOTENSION: ICD-10-CM

## 2020-02-13 DIAGNOSIS — Z79.899 OTHER LONG TERM (CURRENT) DRUG THERAPY: ICD-10-CM

## 2023-08-08 NOTE — DIETITIAN INITIAL EVALUATION ADULT. - PERTINENT LABORATORY DATA
previous_has_had_previous_treatment
When Outside In The Sun, Do You...: rarely burns, tans with ease
2/6: H/H=10.9/33.1, Glu 118

## 2024-05-09 NOTE — ASU PREOP CHECKLIST - LATEX ALLERGY
The patient was seen for a neuropsychological evaluation for the purposes of diagnostic clarification and treatment planning. 80 minutes of face-to-face testing were provided by this writer. An additional 25 minutes were spent scoring and compiling test results. The patient was cooperative with testing. No concerns were brought to my attention. Please see Dr. Mcdermott's report for a detailed description of the charges and interpretation and integration of the findings.     no